# Patient Record
Sex: MALE | Race: WHITE | NOT HISPANIC OR LATINO | Employment: FULL TIME | URBAN - METROPOLITAN AREA
[De-identification: names, ages, dates, MRNs, and addresses within clinical notes are randomized per-mention and may not be internally consistent; named-entity substitution may affect disease eponyms.]

---

## 2023-06-28 DIAGNOSIS — R31.9 HEMATURIA, UNSPECIFIED TYPE: ICD-10-CM

## 2023-06-29 ENCOUNTER — LAB (OUTPATIENT)
Dept: LAB | Facility: LAB | Age: 63
End: 2023-06-29
Payer: COMMERCIAL

## 2023-06-29 DIAGNOSIS — R31.9 HEMATURIA, UNSPECIFIED TYPE: ICD-10-CM

## 2023-06-29 LAB
APPEARANCE, URINE: CLEAR
BILIRUBIN, URINE: NEGATIVE
BLOOD, URINE: NEGATIVE
COLOR, URINE: YELLOW
GLUCOSE, URINE: NEGATIVE MG/DL
KETONES, URINE: NEGATIVE MG/DL
LEUKOCYTE ESTERASE, URINE: NEGATIVE
NITRITE, URINE: NEGATIVE
PH, URINE: 6 (ref 5–8)
PROTEIN, URINE: NEGATIVE MG/DL
SPECIFIC GRAVITY, URINE: 1.01 (ref 1–1.03)
UROBILINOGEN, URINE: <2 MG/DL (ref 0–1.9)

## 2023-06-29 PROCEDURE — 81003 URINALYSIS AUTO W/O SCOPE: CPT

## 2023-06-29 PROCEDURE — 87086 URINE CULTURE/COLONY COUNT: CPT

## 2023-06-30 LAB — URINE CULTURE: NO GROWTH

## 2023-07-07 LAB
ANION GAP IN SER/PLAS: 10 MMOL/L (ref 10–20)
CALCIUM (MG/DL) IN SER/PLAS: 9.6 MG/DL (ref 8.6–10.6)
CARBON DIOXIDE, TOTAL (MMOL/L) IN SER/PLAS: 30 MMOL/L (ref 21–32)
CHLORIDE (MMOL/L) IN SER/PLAS: 104 MMOL/L (ref 98–107)
CREATININE (MG/DL) IN SER/PLAS: 0.88 MG/DL (ref 0.5–1.3)
GFR MALE: >90 ML/MIN/1.73M2
GLUCOSE (MG/DL) IN SER/PLAS: 84 MG/DL (ref 74–99)
POTASSIUM (MMOL/L) IN SER/PLAS: 3.9 MMOL/L (ref 3.5–5.3)
SODIUM (MMOL/L) IN SER/PLAS: 140 MMOL/L (ref 136–145)
UREA NITROGEN (MG/DL) IN SER/PLAS: 17 MG/DL (ref 6–23)

## 2023-12-07 ASSESSMENT — PROMIS GLOBAL HEALTH SCALE
RATE_PHYSICAL_HEALTH: VERY GOOD
RATE_AVERAGE_PAIN: 0
RATE_QUALITY_OF_LIFE: VERY GOOD
RATE_MENTAL_HEALTH: VERY GOOD
RATE_SOCIAL_SATISFACTION: VERY GOOD
CARRYOUT_SOCIAL_ACTIVITIES: VERY GOOD
RATE_GENERAL_HEALTH: VERY GOOD
CARRYOUT_PHYSICAL_ACTIVITIES: COMPLETELY
EMOTIONAL_PROBLEMS: RARELY

## 2023-12-08 ENCOUNTER — LAB (OUTPATIENT)
Dept: LAB | Facility: LAB | Age: 63
End: 2023-12-08
Payer: COMMERCIAL

## 2023-12-08 DIAGNOSIS — Z00.00 ROUTINE GENERAL MEDICAL EXAMINATION AT A HEALTH CARE FACILITY: ICD-10-CM

## 2023-12-08 LAB
ALBUMIN SERPL BCP-MCNC: 4 G/DL (ref 3.4–5)
ALP SERPL-CCNC: 60 U/L (ref 33–136)
ALT SERPL W P-5'-P-CCNC: 9 U/L (ref 10–52)
ANION GAP SERPL CALC-SCNC: 12 MMOL/L (ref 10–20)
APPEARANCE UR: CLEAR
AST SERPL W P-5'-P-CCNC: 14 U/L (ref 9–39)
BASOPHILS # BLD AUTO: 0.03 X10*3/UL (ref 0–0.1)
BASOPHILS NFR BLD AUTO: 0.7 %
BILIRUB SERPL-MCNC: 0.5 MG/DL (ref 0–1.2)
BILIRUB UR STRIP.AUTO-MCNC: NEGATIVE MG/DL
BUN SERPL-MCNC: 20 MG/DL (ref 6–23)
CALCIUM SERPL-MCNC: 9.5 MG/DL (ref 8.6–10.6)
CHLORIDE SERPL-SCNC: 105 MMOL/L (ref 98–107)
CHOLEST SERPL-MCNC: 181 MG/DL (ref 0–199)
CHOLESTEROL/HDL RATIO: 2.5
CO2 SERPL-SCNC: 27 MMOL/L (ref 21–32)
COLOR UR: YELLOW
CREAT SERPL-MCNC: 0.98 MG/DL (ref 0.5–1.3)
EOSINOPHIL # BLD AUTO: 0.27 X10*3/UL (ref 0–0.7)
EOSINOPHIL NFR BLD AUTO: 6.3 %
ERYTHROCYTE [DISTWIDTH] IN BLOOD BY AUTOMATED COUNT: 13.1 % (ref 11.5–14.5)
GFR SERPL CREATININE-BSD FRML MDRD: 86 ML/MIN/1.73M*2
GLUCOSE SERPL-MCNC: 95 MG/DL (ref 74–99)
GLUCOSE UR STRIP.AUTO-MCNC: NEGATIVE MG/DL
HCT VFR BLD AUTO: 41.5 % (ref 41–52)
HDLC SERPL-MCNC: 72.9 MG/DL
HGB BLD-MCNC: 13.7 G/DL (ref 13.5–17.5)
IMM GRANULOCYTES # BLD AUTO: 0.01 X10*3/UL (ref 0–0.7)
IMM GRANULOCYTES NFR BLD AUTO: 0.2 % (ref 0–0.9)
KETONES UR STRIP.AUTO-MCNC: NEGATIVE MG/DL
LDLC SERPL CALC-MCNC: 100 MG/DL
LEUKOCYTE ESTERASE UR QL STRIP.AUTO: NEGATIVE
LYMPHOCYTES # BLD AUTO: 1.53 X10*3/UL (ref 1.2–4.8)
LYMPHOCYTES NFR BLD AUTO: 35.8 %
MCH RBC QN AUTO: 31 PG (ref 26–34)
MCHC RBC AUTO-ENTMCNC: 33 G/DL (ref 32–36)
MCV RBC AUTO: 94 FL (ref 80–100)
MONOCYTES # BLD AUTO: 0.37 X10*3/UL (ref 0.1–1)
MONOCYTES NFR BLD AUTO: 8.7 %
NEUTROPHILS # BLD AUTO: 2.06 X10*3/UL (ref 1.2–7.7)
NEUTROPHILS NFR BLD AUTO: 48.3 %
NITRITE UR QL STRIP.AUTO: NEGATIVE
NON HDL CHOLESTEROL: 108 MG/DL (ref 0–149)
NRBC BLD-RTO: 0 /100 WBCS (ref 0–0)
PH UR STRIP.AUTO: 6 [PH]
PLATELET # BLD AUTO: 157 X10*3/UL (ref 150–450)
POTASSIUM SERPL-SCNC: 3.9 MMOL/L (ref 3.5–5.3)
PROT SERPL-MCNC: 6.3 G/DL (ref 6.4–8.2)
PROT UR STRIP.AUTO-MCNC: NEGATIVE MG/DL
PSA SERPL-MCNC: 1.42 NG/ML
RBC # BLD AUTO: 4.42 X10*6/UL (ref 4.5–5.9)
RBC # UR STRIP.AUTO: NEGATIVE /UL
SODIUM SERPL-SCNC: 140 MMOL/L (ref 136–145)
SP GR UR STRIP.AUTO: 1.02
TRIGL SERPL-MCNC: 43 MG/DL (ref 0–149)
TSH SERPL-ACNC: 3.84 MIU/L (ref 0.44–3.98)
UROBILINOGEN UR STRIP.AUTO-MCNC: <2 MG/DL
VLDL: 9 MG/DL (ref 0–40)
WBC # BLD AUTO: 4.3 X10*3/UL (ref 4.4–11.3)

## 2023-12-08 PROCEDURE — 36415 COLL VENOUS BLD VENIPUNCTURE: CPT

## 2023-12-08 PROCEDURE — 84443 ASSAY THYROID STIM HORMONE: CPT

## 2023-12-08 PROCEDURE — 81003 URINALYSIS AUTO W/O SCOPE: CPT

## 2023-12-08 PROCEDURE — 80053 COMPREHEN METABOLIC PANEL: CPT

## 2023-12-08 PROCEDURE — 85025 COMPLETE CBC W/AUTO DIFF WBC: CPT

## 2023-12-08 PROCEDURE — 80061 LIPID PANEL: CPT

## 2023-12-08 PROCEDURE — 84153 ASSAY OF PSA TOTAL: CPT

## 2023-12-14 ENCOUNTER — OFFICE VISIT (OUTPATIENT)
Dept: PRIMARY CARE | Facility: CLINIC | Age: 63
End: 2023-12-14
Payer: COMMERCIAL

## 2023-12-14 VITALS
SYSTOLIC BLOOD PRESSURE: 124 MMHG | WEIGHT: 161.8 LBS | HEART RATE: 57 BPM | OXYGEN SATURATION: 95 % | HEIGHT: 71 IN | RESPIRATION RATE: 16 BRPM | DIASTOLIC BLOOD PRESSURE: 72 MMHG | BODY MASS INDEX: 22.65 KG/M2

## 2023-12-14 DIAGNOSIS — E78.9 LIPID DISORDER: ICD-10-CM

## 2023-12-14 DIAGNOSIS — Z13.6 SCREENING FOR CARDIOVASCULAR CONDITION: ICD-10-CM

## 2023-12-14 DIAGNOSIS — Z00.00 ROUTINE GENERAL MEDICAL EXAMINATION AT A HEALTH CARE FACILITY: Primary | ICD-10-CM

## 2023-12-14 PROBLEM — K62.1 RECTAL POLYP: Status: ACTIVE | Noted: 2023-12-14

## 2023-12-14 PROBLEM — K40.90 INGUINAL HERNIA: Status: ACTIVE | Noted: 2023-12-14

## 2023-12-14 PROBLEM — L82.1 OTHER SEBORRHEIC KERATOSIS: Status: ACTIVE | Noted: 2018-02-12

## 2023-12-14 PROBLEM — D48.5 NEOPLASM OF UNCERTAIN BEHAVIOR OF SKIN: Status: ACTIVE | Noted: 2018-02-12

## 2023-12-14 PROBLEM — H90.3 ASYMMETRICAL SENSORINEURAL HEARING LOSS: Status: ACTIVE | Noted: 2023-12-14

## 2023-12-14 PROBLEM — H43.11 VITREOUS HEMORRHAGE OF RIGHT EYE (MULTI): Status: ACTIVE | Noted: 2023-12-14

## 2023-12-14 PROBLEM — D22.5 MELANOCYTIC NEVI OF TRUNK: Status: ACTIVE | Noted: 2018-02-12

## 2023-12-14 PROBLEM — L57.9 SKIN CHANGES DUE TO CHRONIC EXPOSURE TO NONIONIZING RADIATION, UNSPECIFIED: Status: ACTIVE | Noted: 2018-02-12

## 2023-12-14 PROBLEM — H90.42 SENSORINEURAL HEARING LOSS, UNILATERAL, LEFT EAR, WITH UNRESTRICTED HEARING ON THE CONTRALATERAL SIDE: Status: ACTIVE | Noted: 2023-12-14

## 2023-12-14 PROBLEM — H33.301 RETINAL HOLE OR TEAR, RIGHT: Status: ACTIVE | Noted: 2023-12-14

## 2023-12-14 PROBLEM — L57.0 ACTINIC KERATOSIS: Status: ACTIVE | Noted: 2018-02-12

## 2023-12-14 PROBLEM — N52.9 ORGANIC IMPOTENCE: Status: ACTIVE | Noted: 2023-12-14

## 2023-12-14 PROBLEM — H43.811 POSTERIOR VITREOUS DETACHMENT OF RIGHT EYE: Status: ACTIVE | Noted: 2023-12-14

## 2023-12-14 PROBLEM — L81.4 OTHER MELANIN HYPERPIGMENTATION: Status: ACTIVE | Noted: 2018-02-12

## 2023-12-14 PROCEDURE — 1160F RVW MEDS BY RX/DR IN RCRD: CPT | Performed by: INTERNAL MEDICINE

## 2023-12-14 PROCEDURE — 99397 PER PM REEVAL EST PAT 65+ YR: CPT | Performed by: INTERNAL MEDICINE

## 2023-12-14 PROCEDURE — 1159F MED LIST DOCD IN RCRD: CPT | Performed by: INTERNAL MEDICINE

## 2023-12-14 PROCEDURE — 1036F TOBACCO NON-USER: CPT | Performed by: INTERNAL MEDICINE

## 2023-12-14 ASSESSMENT — PATIENT HEALTH QUESTIONNAIRE - PHQ9
2. FEELING DOWN, DEPRESSED OR HOPELESS: NOT AT ALL
SUM OF ALL RESPONSES TO PHQ9 QUESTIONS 1 AND 2: 0
1. LITTLE INTEREST OR PLEASURE IN DOING THINGS: NOT AT ALL

## 2023-12-14 ASSESSMENT — ENCOUNTER SYMPTOMS
LOSS OF SENSATION IN FEET: 0
OCCASIONAL FEELINGS OF UNSTEADINESS: 0
DEPRESSION: 0

## 2023-12-14 NOTE — PROGRESS NOTES
"Subjective   Patient ID: Stan Ham is a 63 y.o. male who presents for Annual Exam.    HPI     63 M    Doing Well- working in IT at  [employer]    H/o Hematuria- Seen by Dr Acuna- Cystoscopy and CT Urogram    No Fever/chills/headaches/dizziness/chest pains/ cough/ shortness of breath/palpitations/ abdominal pain /Nausea/vomiting/diarrhea/ constipation/urine frequency/blood in urine.      Objective   Pulse 57   Resp 16   Ht 1.791 m (5' 10.51\")   Wt 73.4 kg (161 lb 12.8 oz)   SpO2 95%   BMI 22.88 kg/m²     Physical Exam    No JVP elevation. No palpable Lymph Nodes. No Thyromegaly    HEENT- Negative    CVS-NL S1/S2 . No MRG    Lungs-CTA. B/S= B/L    Abdomen-Soft, Non-tender. No masses or HSM    Extremities: No C/C/E    Skin-No abnormal moles/rash    No masses or hernia.     Prostate-Enlarged        Assessment/Plan        HLD-Discussed Benefits and S/E of statin Rx    H/o Hematuria-seen by Urology- Follow up in 12 months    CRC- 2019    Flu/RSV/Prevnar 20    Continue with current Rx    Follow up in 12 months /PRN                "

## 2024-03-26 ENCOUNTER — TELEPHONE (OUTPATIENT)
Dept: PRIMARY CARE | Facility: CLINIC | Age: 64
End: 2024-03-26
Payer: COMMERCIAL

## 2024-03-28 DIAGNOSIS — Z12.11 ENCOUNTER FOR SCREENING COLONOSCOPY: ICD-10-CM

## 2024-03-28 NOTE — TELEPHONE ENCOUNTER
Called patient and left a voice mail informing him that I put the referral in.    Loraine Johnson MA

## 2024-04-01 DIAGNOSIS — Z12.11 COLON CANCER SCREENING: ICD-10-CM

## 2024-04-01 RX ORDER — POLYETHYLENE GLYCOL 3350, SODIUM SULFATE ANHYDROUS, SODIUM BICARBONATE, SODIUM CHLORIDE, POTASSIUM CHLORIDE 236; 22.74; 6.74; 5.86; 2.97 G/4L; G/4L; G/4L; G/4L; G/4L
4000 POWDER, FOR SOLUTION ORAL ONCE
Qty: 4000 ML | Refills: 0 | Status: SHIPPED | OUTPATIENT
Start: 2024-04-01 | End: 2024-04-01

## 2024-05-09 ENCOUNTER — OFFICE VISIT (OUTPATIENT)
Dept: UROLOGY | Facility: HOSPITAL | Age: 64
End: 2024-05-09
Payer: COMMERCIAL

## 2024-05-09 DIAGNOSIS — R31.0 GROSS HEMATURIA: ICD-10-CM

## 2024-05-09 DIAGNOSIS — R39.9 LOWER URINARY TRACT SYMPTOMS (LUTS): Primary | ICD-10-CM

## 2024-05-09 LAB
POC APPEARANCE, URINE: CLEAR
POC BILIRUBIN, URINE: NEGATIVE
POC BLOOD, URINE: NEGATIVE
POC COLOR, URINE: YELLOW
POC GLUCOSE, URINE: NEGATIVE MG/DL
POC KETONES, URINE: NEGATIVE MG/DL
POC LEUKOCYTES, URINE: NEGATIVE
POC NITRITE,URINE: NEGATIVE
POC PH, URINE: 6 PH
POC PROTEIN, URINE: NEGATIVE MG/DL
POC SPECIFIC GRAVITY, URINE: 1.02
POC UROBILINOGEN, URINE: 0.2 EU/DL

## 2024-05-09 PROCEDURE — 99213 OFFICE O/P EST LOW 20 MIN: CPT | Performed by: UROLOGY

## 2024-05-09 PROCEDURE — 51798 US URINE CAPACITY MEASURE: CPT | Performed by: UROLOGY

## 2024-05-09 PROCEDURE — 1159F MED LIST DOCD IN RCRD: CPT | Performed by: UROLOGY

## 2024-05-09 PROCEDURE — 81003 URINALYSIS AUTO W/O SCOPE: CPT | Performed by: UROLOGY

## 2024-05-09 PROCEDURE — G2211 COMPLEX E/M VISIT ADD ON: HCPCS | Performed by: UROLOGY

## 2024-08-05 ENCOUNTER — APPOINTMENT (OUTPATIENT)
Dept: UROLOGY | Facility: HOSPITAL | Age: 64
End: 2024-08-05
Payer: COMMERCIAL

## 2024-08-22 ENCOUNTER — APPOINTMENT (OUTPATIENT)
Dept: UROLOGY | Facility: HOSPITAL | Age: 64
End: 2024-08-22
Payer: COMMERCIAL

## 2024-08-27 DIAGNOSIS — K62.1 RECTAL POLYP: ICD-10-CM

## 2024-08-27 RX ORDER — POLYETHYLENE GLYCOL 3350, SODIUM SULFATE ANHYDROUS, SODIUM BICARBONATE, SODIUM CHLORIDE, POTASSIUM CHLORIDE 236; 22.74; 6.74; 5.86; 2.97 G/4L; G/4L; G/4L; G/4L; G/4L
POWDER, FOR SOLUTION ORAL
Qty: 4000 ML | Refills: 0 | Status: SHIPPED | OUTPATIENT
Start: 2024-08-27

## 2024-09-25 ENCOUNTER — APPOINTMENT (OUTPATIENT)
Dept: GASTROENTEROLOGY | Facility: EXTERNAL LOCATION | Age: 64
End: 2024-09-25
Payer: COMMERCIAL

## 2024-09-25 DIAGNOSIS — Z86.010 PERSONAL HISTORY OF COLONIC POLYPS: ICD-10-CM

## 2024-09-25 DIAGNOSIS — Z12.11 ENCOUNTER FOR SCREENING COLONOSCOPY: ICD-10-CM

## 2024-09-25 DIAGNOSIS — Z12.11 ENCOUNTER FOR SCREENING COLONOSCOPY: Primary | ICD-10-CM

## 2024-09-25 PROCEDURE — G0105 COLORECTAL SCRN; HI RISK IND: HCPCS | Performed by: INTERNAL MEDICINE

## 2024-09-25 PROCEDURE — 43239 EGD BIOPSY SINGLE/MULTIPLE: CPT | Performed by: INTERNAL MEDICINE

## 2024-10-10 ENCOUNTER — OFFICE VISIT (OUTPATIENT)
Dept: UROLOGY | Facility: HOSPITAL | Age: 64
End: 2024-10-10
Payer: COMMERCIAL

## 2024-10-10 DIAGNOSIS — R39.9 LOWER URINARY TRACT SYMPTOMS (LUTS): ICD-10-CM

## 2024-10-10 LAB
POC APPEARANCE, URINE: CLEAR
POC BILIRUBIN, URINE: NEGATIVE
POC BLOOD, URINE: NEGATIVE
POC COLOR, URINE: YELLOW
POC GLUCOSE, URINE: NEGATIVE MG/DL
POC KETONES, URINE: NEGATIVE MG/DL
POC LEUKOCYTES, URINE: ABNORMAL
POC NITRITE,URINE: NEGATIVE
POC PH, URINE: 6.5 PH
POC PROTEIN, URINE: NEGATIVE MG/DL
POC SPECIFIC GRAVITY, URINE: 1.02
POC UROBILINOGEN, URINE: 0.2 EU/DL

## 2024-10-10 PROCEDURE — 99213 OFFICE O/P EST LOW 20 MIN: CPT | Performed by: UROLOGY

## 2024-10-10 PROCEDURE — 51798 US URINE CAPACITY MEASURE: CPT | Performed by: UROLOGY

## 2024-10-10 PROCEDURE — G2211 COMPLEX E/M VISIT ADD ON: HCPCS | Performed by: UROLOGY

## 2024-10-10 PROCEDURE — 81003 URINALYSIS AUTO W/O SCOPE: CPT | Mod: QW | Performed by: UROLOGY

## 2024-10-10 NOTE — PROGRESS NOTES
FUV    Last seen - 5/9/24     HISTORY OF PRESENT ILLNESS:   Niles Ham is a 64 y.o. male who is being seen today for fuv    Prior hematuria work  CTU - no hydro, stones, filling defects or masses. Left renal simple cyst. Distended bladder. Prostate approx 92g  Cysto - no masses, stones, areas of concern  Urine cytology - no malignant cells    Most recent PSA 1.42    Gets up once a night.  No dysuria or hematuria.  Stream ok    PAST MEDICAL HISTORY:  Past Medical History:   Diagnosis Date    Male erectile dysfunction, unspecified 01/27/2020    Organic impotence    Other allergy status, other than to drugs and biological substances 03/18/2014    Environmental allergies    Scarlet fever, uncomplicated 03/18/2014    Scarlet fever, uncomplicated       PAST SURGICAL HISTORY:  Past Surgical History:   Procedure Laterality Date    ANTERIOR CRUCIATE LIGAMENT REPAIR  03/18/2014    Primary Repair Of Knee Ligament Cruciate Anterior    COLONOSCOPY  06/21/2020    Complete Colonoscopy    CYSTOSCOPY      HERNIA REPAIR      OTHER SURGICAL HISTORY  03/18/2014    Hernia Repair Inguinal Unilat Indirect With Graft Prosth    VASECTOMY          ALLERGIES:   Allergies   Allergen Reactions    Bee Pollen Other    House Dust Unknown        MEDICATIONS:   Current Outpatient Medications   Medication Instructions    polyethylene glycol (GoLYTELY) 236-22.74-6.74 -5.86 gram solution Drink 1/2 starting at 6 pm the night before your procedure then drink the 2nd 1/2 5 hours before procedure arrival time        PHYSICAL EXAM:  There were no vitals taken for this visit.  Constitutional: Patient appears well-developed and well-nourished. No distress.    Pulmonary/Chest: Effort normal. No respiratory distress.   Musculoskeletal: Normal range of motion.    Neurological: Alert and oriented to person, place, and time.  Psychiatric: Normal mood and affect. Behavior is normal. Thought content normal.      Labs  Lab Results   Component Value Date    PSA  1.17 06/19/2020     Lab Results   Component Value Date    GFRMALE >90 07/07/2023     Lab Results   Component Value Date    CREATININE 0.98 12/08/2023     Lab Results   Component Value Date    CHOL 181 12/08/2023     Lab Results   Component Value Date    HDL 72.9 12/08/2023     Lab Results   Component Value Date    CHHDL 2.5 12/08/2023     Lab Results   Component Value Date    LDLF 99 12/01/2022     Lab Results   Component Value Date    VLDL 9 12/08/2023     Lab Results   Component Value Date    TRIG 43 12/08/2023     Lab Results   Component Value Date    HGBA1C 5.2 06/19/2020     Lab Results   Component Value Date    TESTF 64.7 06/19/2020     Lab Results   Component Value Date    HCT 41.5 12/08/2023       UA neg  PVR 97cc      Assessment:      1. Lower urinary tract symptoms (LUTS)  Measure post void residual          Niles Ham is a 64 y.o. male here for FUV    Doing well  Plan:   1)  Discussed voiding symptoms.  PVR slightly elevated.  Discussed alpha-blocker.  Wants to hold off.  Conservative measures discussed  2) cont yearly PSA    Lower Urinary Tract Symptoms (LUTS)    I educated the patient on relevant lower urinary tract anatomy and physiology with emphasis on differential diagnosis as well as contributing factors to the patient's lower urinary tract symptoms. I educated the patient on dietary and behavioral modifications pertinent to the patient's complaints. I recommended to avoid caffeine, alcohol, spicy and acidic oral intake and to regulate fluid intake and voiding (timed voiding, double voiding). I stressed the importance of avoiding constipation and recommended stool softeners unless diarrhea present. We discussed limiting fluid intake at night and elevating legs prior to bedtime.     The mechanism of action as well as the risks, benefits, common side effects, and alternatives to all prescribed medications were discussed with the patient at length. The patient had the opportunity to ask questions  and all questions were answered. I primarily discussed alpha blockers, 5ARIs, PDE5i, anticholinergics, and beta 3 agonist (mirabegron).  I explained that alpha blockers help decrease bladder outlet resistance with potential side effects to include retrograde ejaculation, rhinitis, and light headedness. I explained that 5 alpha reductase inhibitors can shrink the prostate up to 30%, can artificially decrease their PSA value by 50%, but take approximately 6-9 months to reach full efficacy and have potential side effects to include decreased libido, impotence and breast tenderness.       FU yearly, sooner if needed

## 2024-12-17 ENCOUNTER — APPOINTMENT (OUTPATIENT)
Dept: PRIMARY CARE | Facility: CLINIC | Age: 64
End: 2024-12-17
Payer: COMMERCIAL

## 2024-12-17 ENCOUNTER — LAB (OUTPATIENT)
Dept: LAB | Facility: LAB | Age: 64
End: 2024-12-17
Payer: COMMERCIAL

## 2024-12-17 VITALS
SYSTOLIC BLOOD PRESSURE: 122 MMHG | OXYGEN SATURATION: 95 % | HEART RATE: 59 BPM | BODY MASS INDEX: 24.39 KG/M2 | WEIGHT: 170.4 LBS | HEIGHT: 70 IN | DIASTOLIC BLOOD PRESSURE: 71 MMHG | TEMPERATURE: 97.9 F

## 2024-12-17 DIAGNOSIS — Z12.5 ENCOUNTER FOR PROSTATE CANCER SCREENING: ICD-10-CM

## 2024-12-17 DIAGNOSIS — R35.1 BENIGN PROSTATIC HYPERPLASIA WITH NOCTURIA: ICD-10-CM

## 2024-12-17 DIAGNOSIS — R73.9 HYPERGLYCEMIA: ICD-10-CM

## 2024-12-17 DIAGNOSIS — Z51.81 ENCOUNTER FOR MEDICATION MONITORING: ICD-10-CM

## 2024-12-17 DIAGNOSIS — Z51.81 ENCOUNTER FOR MEDICATION MONITORING: Primary | ICD-10-CM

## 2024-12-17 DIAGNOSIS — Z00.00 ROUTINE GENERAL MEDICAL EXAMINATION AT A HEALTH CARE FACILITY: ICD-10-CM

## 2024-12-17 DIAGNOSIS — N40.1 BENIGN PROSTATIC HYPERPLASIA WITH NOCTURIA: ICD-10-CM

## 2024-12-17 DIAGNOSIS — E78.5 DYSLIPIDEMIA: ICD-10-CM

## 2024-12-17 LAB
ALBUMIN SERPL BCP-MCNC: 4.1 G/DL (ref 3.4–5)
ALP SERPL-CCNC: 84 U/L (ref 33–136)
ALT SERPL W P-5'-P-CCNC: 10 U/L (ref 10–52)
ANION GAP SERPL CALC-SCNC: 10 MMOL/L (ref 10–20)
APPEARANCE UR: CLEAR
AST SERPL W P-5'-P-CCNC: 16 U/L (ref 9–39)
BASOPHILS # BLD AUTO: 0.03 X10*3/UL (ref 0–0.1)
BASOPHILS NFR BLD AUTO: 0.3 %
BILIRUB SERPL-MCNC: 0.5 MG/DL (ref 0–1.2)
BILIRUB UR STRIP.AUTO-MCNC: NEGATIVE MG/DL
BUN SERPL-MCNC: 21 MG/DL (ref 6–23)
CALCIUM SERPL-MCNC: 9.6 MG/DL (ref 8.6–10.3)
CHLORIDE SERPL-SCNC: 102 MMOL/L (ref 98–107)
CHOLEST SERPL-MCNC: 177 MG/DL (ref 0–199)
CHOLESTEROL/HDL RATIO: 2.3
CO2 SERPL-SCNC: 29 MMOL/L (ref 21–32)
COLOR UR: NORMAL
CREAT SERPL-MCNC: 0.8 MG/DL (ref 0.5–1.3)
EGFRCR SERPLBLD CKD-EPI 2021: >90 ML/MIN/1.73M*2
EOSINOPHIL # BLD AUTO: 0.08 X10*3/UL (ref 0–0.7)
EOSINOPHIL NFR BLD AUTO: 0.8 %
ERYTHROCYTE [DISTWIDTH] IN BLOOD BY AUTOMATED COUNT: 12.6 % (ref 11.5–14.5)
EST. AVERAGE GLUCOSE BLD GHB EST-MCNC: 100 MG/DL
GLUCOSE SERPL-MCNC: 103 MG/DL (ref 74–99)
GLUCOSE UR STRIP.AUTO-MCNC: NORMAL MG/DL
HBA1C MFR BLD: 5.1 %
HCT VFR BLD AUTO: 43.3 % (ref 41–52)
HDLC SERPL-MCNC: 75.7 MG/DL
HGB BLD-MCNC: 14.3 G/DL (ref 13.5–17.5)
HOLD SPECIMEN: NORMAL
IMM GRANULOCYTES # BLD AUTO: 0.05 X10*3/UL (ref 0–0.7)
IMM GRANULOCYTES NFR BLD AUTO: 0.5 % (ref 0–0.9)
KETONES UR STRIP.AUTO-MCNC: NEGATIVE MG/DL
LDLC SERPL CALC-MCNC: 92 MG/DL
LEUKOCYTE ESTERASE UR QL STRIP.AUTO: NEGATIVE
LYMPHOCYTES # BLD AUTO: 2.01 X10*3/UL (ref 1.2–4.8)
LYMPHOCYTES NFR BLD AUTO: 21 %
MCH RBC QN AUTO: 30.3 PG (ref 26–34)
MCHC RBC AUTO-ENTMCNC: 33 G/DL (ref 32–36)
MCV RBC AUTO: 92 FL (ref 80–100)
MONOCYTES # BLD AUTO: 0.61 X10*3/UL (ref 0.1–1)
MONOCYTES NFR BLD AUTO: 6.4 %
NEUTROPHILS # BLD AUTO: 6.78 X10*3/UL (ref 1.2–7.7)
NEUTROPHILS NFR BLD AUTO: 71 %
NITRITE UR QL STRIP.AUTO: NEGATIVE
NON HDL CHOLESTEROL: 101 MG/DL (ref 0–149)
NRBC BLD-RTO: 0 /100 WBCS (ref 0–0)
PH UR STRIP.AUTO: 6 [PH]
PLATELET # BLD AUTO: 181 X10*3/UL (ref 150–450)
POTASSIUM SERPL-SCNC: 4.2 MMOL/L (ref 3.5–5.3)
PROT SERPL-MCNC: 6.7 G/DL (ref 6.4–8.2)
PROT UR STRIP.AUTO-MCNC: NEGATIVE MG/DL
PSA SERPL-MCNC: 3.12 NG/ML
RBC # BLD AUTO: 4.72 X10*6/UL (ref 4.5–5.9)
RBC # UR STRIP.AUTO: NEGATIVE /UL
SODIUM SERPL-SCNC: 137 MMOL/L (ref 136–145)
SP GR UR STRIP.AUTO: 1.01
TRIGL SERPL-MCNC: 48 MG/DL (ref 0–149)
UROBILINOGEN UR STRIP.AUTO-MCNC: NORMAL MG/DL
VLDL: 10 MG/DL (ref 0–40)
WBC # BLD AUTO: 9.6 X10*3/UL (ref 4.4–11.3)

## 2024-12-17 PROCEDURE — 1123F ACP DISCUSS/DSCN MKR DOCD: CPT | Performed by: STUDENT IN AN ORGANIZED HEALTH CARE EDUCATION/TRAINING PROGRAM

## 2024-12-17 PROCEDURE — G0103 PSA SCREENING: HCPCS

## 2024-12-17 PROCEDURE — 99397 PER PM REEVAL EST PAT 65+ YR: CPT | Performed by: STUDENT IN AN ORGANIZED HEALTH CARE EDUCATION/TRAINING PROGRAM

## 2024-12-17 PROCEDURE — 1159F MED LIST DOCD IN RCRD: CPT | Performed by: STUDENT IN AN ORGANIZED HEALTH CARE EDUCATION/TRAINING PROGRAM

## 2024-12-17 PROCEDURE — 36415 COLL VENOUS BLD VENIPUNCTURE: CPT

## 2024-12-17 PROCEDURE — 1036F TOBACCO NON-USER: CPT | Performed by: STUDENT IN AN ORGANIZED HEALTH CARE EDUCATION/TRAINING PROGRAM

## 2024-12-17 PROCEDURE — 1158F ADVNC CARE PLAN TLK DOCD: CPT | Performed by: STUDENT IN AN ORGANIZED HEALTH CARE EDUCATION/TRAINING PROGRAM

## 2024-12-17 PROCEDURE — 83036 HEMOGLOBIN GLYCOSYLATED A1C: CPT

## 2024-12-17 PROCEDURE — 3008F BODY MASS INDEX DOCD: CPT | Performed by: STUDENT IN AN ORGANIZED HEALTH CARE EDUCATION/TRAINING PROGRAM

## 2024-12-17 PROCEDURE — 85025 COMPLETE CBC W/AUTO DIFF WBC: CPT

## 2024-12-17 PROCEDURE — 1160F RVW MEDS BY RX/DR IN RCRD: CPT | Performed by: STUDENT IN AN ORGANIZED HEALTH CARE EDUCATION/TRAINING PROGRAM

## 2024-12-17 PROCEDURE — 81003 URINALYSIS AUTO W/O SCOPE: CPT

## 2024-12-17 PROCEDURE — 80053 COMPREHEN METABOLIC PANEL: CPT

## 2024-12-17 PROCEDURE — 1126F AMNT PAIN NOTED NONE PRSNT: CPT | Performed by: STUDENT IN AN ORGANIZED HEALTH CARE EDUCATION/TRAINING PROGRAM

## 2024-12-17 PROCEDURE — 80061 LIPID PANEL: CPT

## 2024-12-17 ASSESSMENT — ENCOUNTER SYMPTOMS
SHORTNESS OF BREATH: 0
RHINORRHEA: 0
ABDOMINAL PAIN: 0
CHILLS: 0
EYE PAIN: 0
UNEXPECTED WEIGHT CHANGE: 0
DEPRESSION: 0
HEMATURIA: 0
LIGHT-HEADEDNESS: 0
FEVER: 0
OCCASIONAL FEELINGS OF UNSTEADINESS: 0
LOSS OF SENSATION IN FEET: 0
DIZZINESS: 0

## 2024-12-17 ASSESSMENT — PAIN SCALES - GENERAL: PAINLEVEL_OUTOF10: 0-NO PAIN

## 2024-12-17 NOTE — ASSESSMENT & PLAN NOTE
I reviewed prior hematuria work  CTU - no hydro, stones, filling defects or masses. Left renal simple cyst. Distended bladder. Prostate approx 92g  Cysto - no masses, stones, areas of concern  Urine cytology - no malignant cells  -Will reorder PSA level today.  Symptoms otherwise stable.

## 2024-12-17 NOTE — PROGRESS NOTES
"Subjective     Patient ID: Niles Ham \"Stan\" is a 64 y.o. male who presents today for a Preventative Visit/Physical and follow-up of chronic conditions.     Family Hx: Both parents had multiple strokes.       Current Diet: regular diet    Current Physical Activity: Uses [employer]-time workout, HIIT, Yoga.     Tobacco/Alcohol/Drug Use:   Social History     Tobacco Use    Smoking status: Never    Smokeless tobacco: Never   Substance Use Topics    Alcohol use: Yes     Alcohol/week: 5.0 standard drinks of alcohol     Types: 2 Glasses of wine, 3 Cans of beer per week     Comment: per week       Required Screenings/Immunizations:   Health Maintenance Due   Topic Date Due    Derm Melanoma Skin Check  Never done    MMR Vaccines (1 of 1 - Standard series) Never done    DTaP/Tdap/Td Vaccines (1 - Tdap) Never done    Influenza Vaccine (1) 09/01/2024    COVID-19 Vaccine (3 - 2024-25 season) 09/01/2024       Problems to be addressed today in addition to Preventative Services: As stated in orders.     Review of Systems   Constitutional:  Negative for chills, fever and unexpected weight change.   HENT:  Negative for rhinorrhea.    Eyes:  Negative for pain.   Respiratory:  Negative for shortness of breath.    Cardiovascular:  Negative for chest pain.   Gastrointestinal:  Negative for abdominal pain.   Genitourinary:  Negative for hematuria.   Skin:  Negative for rash.   Neurological:  Negative for dizziness, syncope and light-headedness.   Psychiatric/Behavioral:  Negative for behavioral problems and suicidal ideas.        /71 (BP Location: Left arm, Patient Position: Sitting)   Pulse 59   Temp 36.6 °C (97.9 °F)   Ht 1.778 m (5' 10\")   Wt 77.3 kg (170 lb 6.4 oz)   SpO2 95%   BMI 24.45 kg/m²      Objective   Physical Exam  Vitals and nursing note reviewed.   Constitutional:       General: He is not in acute distress.  HENT:      Head: Normocephalic.      Right Ear: External ear normal.      Left Ear: External ear normal. " "     Nose: No rhinorrhea.      Mouth/Throat:      Mouth: Mucous membranes are moist.   Eyes:      Conjunctiva/sclera: Conjunctivae normal.   Cardiovascular:      Rate and Rhythm: Normal rate and regular rhythm.      Heart sounds: No murmur heard.     No friction rub. No gallop.   Pulmonary:      Effort: No respiratory distress.      Breath sounds: No wheezing, rhonchi or rales.   Abdominal:      General: Bowel sounds are normal. There is no distension.      Palpations: Abdomen is soft.      Tenderness: There is no abdominal tenderness.   Musculoskeletal:      Cervical back: Neck supple.      Right lower leg: No edema.      Left lower leg: No edema.   Skin:     General: Skin is warm and dry.      Capillary Refill: Capillary refill takes less than 2 seconds.   Neurological:      Mental Status: He is alert.      Gait: Gait normal.           Labs:   Lab Results   Component Value Date    WBC 4.3 (L) 12/08/2023    HGB 13.7 12/08/2023    HCT 41.5 12/08/2023     12/08/2023    TSH 3.84 12/08/2023    PSA 1.17 06/19/2020     Lab Results   Component Value Date     12/08/2023    K 3.9 12/08/2023     12/08/2023    BUN 20 12/08/2023    CREATININE 0.98 12/08/2023    GLUCOSE 95 12/08/2023    CALCIUM 9.5 12/08/2023    PROT 6.3 (L) 12/08/2023    BILITOT 0.5 12/08/2023    ALKPHOS 60 12/08/2023    AST 14 12/08/2023    ALT 9 (L) 12/08/2023     Lab Results   Component Value Date    CHOL 181 12/08/2023    CHOL 183 12/01/2022    CHOL 197 12/01/2021      Lab Results   Component Value Date    TRIG 43 12/08/2023    TRIG 40 12/01/2022    TRIG 51 12/01/2021      Lab Results   Component Value Date    HDL 72.9 12/08/2023    HDL 76.5 12/01/2022    HDL 74.5 12/01/2021     Lab Results   Component Value Date    LDLCALC 100 (H) 12/08/2023      Lab Results   Component Value Date    VLDL 9 12/08/2023    VLDL 8 12/01/2022    VLDL 10 12/01/2021    No components found for: \"[id number]\"    Imaging/Testing: CT urography w 3D volume " rendered imaging  Narrative: Interpreted By:  JOSE BOLANOS MD  MRN: [id number]  Patient Name: QUINCY CASANOVA     STUDY:  CT UROGRAPHY WITH AND WITHOUT CONTRAST;  7/14/2023 3:18 pm     INDICATION:  hematuria  R31.0: Hematuria, gross.     COMPARISON:  None.     ACCESSION NUMBER(S):  [id number]     ORDERING CLINICIAN:  ANILA SANTIAGO     TECHNIQUE:  CT of the abdomen and pelvis was performed.  Contiguous axial images  were obtained at 3 mm slice thickness through the abdomen and pelvis.  Coronal and sagittal reconstructions at 3 mm slice thickness were  performed.  90 mL Omnipaque 350 administered intravenously without  immediate complication. 3D volume rendered reformatted images were  generated.     FINDINGS:  LOWER CHEST:  Mild bibasilar atelectasis.     ABDOMEN:     LIVER:  Few tiny cysts anteriorly.     BILE DUCTS:  Not dilated.     GALLBLADDER:  No calcified stone or definite inflammation.     PANCREAS:  Unremarkable     SPLEEN:  Unremarkable     ADRENAL GLANDS:  Unremarkable     KIDNEYS AND URETERS:  4.9 cm simple cortical cyst in the left lower pole. No suspicious  renal lesions. The kidneys are normal in cortical thickness and  enhancement.  No hydronephrosis.  No urinary tract calculus identified. No suspicious upper tract  urothelial wall thickening, mass, or filling defect. Please note  segments of both distal ureters are not entirely opacified which can  limit evaluation.     PELVIS:     BLADDER:  Moderately distended. No gross abnormality.     REPRODUCTIVE ORGANS:  Prostatomegaly with a transverse measurement of 6.7 cm.     BOWEL:  No findings of bowel obstruction or inflammation.    Unremarkable  appendix.    Unremarkable mesentery.     VESSELS:  Aortoiliac system is patent without aneurysm.  Major visceral  branches are patent. Mild atherosclerosis.  IVC and major branches are grossly patent.  Major portal venous branches are patent.     PERITONEUM AND RETROPERITONEUM:  No free fluid or free air.    No abdominal  or pelvic lymphadenopathy.     BONE AND ABDOMINAL WALL:  No acute skeletal findings.   Degenerative changes of the spine.     Impression: 1.  Large simple left renal cyst. Otherwise unremarkable kidneys and  ureters without focal suspicious lesion, hydronephrosis, or calculus.  2. Distended urinary bladder.  3. Prostatomegaly.      Problem List Items Addressed This Visit       Benign prostatic hyperplasia with nocturia    Current Assessment & Plan     I reviewed prior hematuria work  CTU - no hydro, stones, filling defects or masses. Left renal simple cyst. Distended bladder. Prostate approx 92g  Cysto - no masses, stones, areas of concern  Urine cytology - no malignant cells  -Will reorder PSA level today.  Symptoms otherwise stable.            Other Visit Diagnoses       Encounter for medication monitoring    -  Primary    Relevant Orders    CBC and Auto Differential    Comprehensive Metabolic Panel    Routine general medical examination at a health care facility        Relevant Orders    CBC and Auto Differential    Comprehensive Metabolic Panel    Lipid panel    Urinalysis with Reflex Culture and Microscopic    Encounter for prostate cancer screening        Relevant Orders    Prostate Specific Antigen, Screen    Dyslipidemia        Relevant Orders    Lipid panel    Hyperglycemia        Relevant Orders    Hemoglobin A1c              As part of today's Preventative Visit, an age and gender-appropriate history and physical was performed, as documented below. Counseling and anticipatory guidance were performed, and risk factor reduction interventions (including [place] Preventative Services Task Force recommended screening tests) were utilized/ordered as outlined in the above Assessment and Plan. All patient medications were reviewed, and refilled if necessary.    Patient and I discussed diet/nutrition, lifestyle modifications, safety, medication indications and side effects, and health goals.    current  treatment plan is effective, no change in therapy, orders and follow up as documented in EMR, lab results reviewed with patient, repeat labs ordered prior to next appointment, reviewed compliance with lifestyle measures, reviewed diet, exercise and weight control, reviewed medications and side effects in detail     1-Year for CPE or welcome to Medicare (if he has medicare at that time)      I have reviewed [facility] report, consistent with prescribed medication. I have considered risks of abuse, diversion, side effects and feel clinically benefit of medication outweighs risks at this time.

## 2024-12-26 ENCOUNTER — APPOINTMENT (OUTPATIENT)
Dept: PRIMARY CARE | Facility: CLINIC | Age: 64
End: 2024-12-26
Payer: COMMERCIAL